# Patient Record
Sex: FEMALE | Race: BLACK OR AFRICAN AMERICAN | NOT HISPANIC OR LATINO | URBAN - METROPOLITAN AREA
[De-identification: names, ages, dates, MRNs, and addresses within clinical notes are randomized per-mention and may not be internally consistent; named-entity substitution may affect disease eponyms.]

---

## 2024-04-18 ENCOUNTER — OV NP (OUTPATIENT)
Dept: URBAN - METROPOLITAN AREA CLINIC 29 | Facility: CLINIC | Age: 59
End: 2024-04-18
Payer: COMMERCIAL

## 2024-04-18 ENCOUNTER — LAB (OUTPATIENT)
Dept: URBAN - METROPOLITAN AREA CLINIC 29 | Facility: CLINIC | Age: 59
End: 2024-04-18

## 2024-04-18 VITALS
DIASTOLIC BLOOD PRESSURE: 88 MMHG | HEIGHT: 65 IN | RESPIRATION RATE: 17 BRPM | BODY MASS INDEX: 45.48 KG/M2 | HEART RATE: 99 BPM | SYSTOLIC BLOOD PRESSURE: 141 MMHG | WEIGHT: 273 LBS

## 2024-04-18 DIAGNOSIS — Z12.11 COLON CANCER SCREENING: ICD-10-CM

## 2024-04-18 PROBLEM — 305058001: Status: ACTIVE | Noted: 2024-04-18

## 2024-04-18 PROCEDURE — 99203 OFFICE O/P NEW LOW 30 MIN: CPT | Performed by: INTERNAL MEDICINE

## 2024-04-18 RX ORDER — SODIUM SULFATE, MAGNESIUM SULFATE, AND POTASSIUM CHLORIDE 17.75; 2.7; 2.25 G/1; G/1; G/1
12 TABLETS THE FIRST DOSE THE EVENING BEFORE AND SECOND DOSE THE MORNING OF COLONOSCOPY TABLET ORAL TWICE A DAY
Qty: 24 TABLET | OUTPATIENT
Start: 2024-04-18 | End: 2024-04-19

## 2024-04-18 NOTE — HPI-TODAY'S VISIT:
Mrs. Akbar is a 58-year-old -American female who presents today per referral Dr. Neil Roy for colorectal cancer screening.  She denies ever having a colonoscopy.  She denies any family history of colon cancer or polyps.  She denies any gastrointestinal issues.  Her medical history is significant for asthma.

## 2024-05-01 ENCOUNTER — OFFICE VISIT (OUTPATIENT)
Dept: URBAN - METROPOLITAN AREA SURGERY CENTER 7 | Facility: SURGERY CENTER | Age: 59
End: 2024-05-01

## 2024-05-07 ENCOUNTER — OFFICE VISIT (OUTPATIENT)
Dept: URBAN - METROPOLITAN AREA SURGERY CENTER 7 | Facility: SURGERY CENTER | Age: 59
End: 2024-05-07